# Patient Record
Sex: MALE | Race: WHITE | NOT HISPANIC OR LATINO | Employment: FULL TIME | URBAN - METROPOLITAN AREA
[De-identification: names, ages, dates, MRNs, and addresses within clinical notes are randomized per-mention and may not be internally consistent; named-entity substitution may affect disease eponyms.]

---

## 2018-01-13 ENCOUNTER — OFFICE VISIT (OUTPATIENT)
Dept: URGENT CARE | Facility: CLINIC | Age: 33
End: 2018-01-13
Payer: MEDICAID

## 2018-01-13 VITALS
DIASTOLIC BLOOD PRESSURE: 74 MMHG | OXYGEN SATURATION: 97 % | SYSTOLIC BLOOD PRESSURE: 124 MMHG | HEART RATE: 78 BPM | TEMPERATURE: 98 F | RESPIRATION RATE: 16 BRPM | HEIGHT: 73 IN | BODY MASS INDEX: 28.49 KG/M2 | WEIGHT: 215 LBS

## 2018-01-13 DIAGNOSIS — Z20.828 EXPOSURE TO INFLUENZA: ICD-10-CM

## 2018-01-13 DIAGNOSIS — R52 BODY ACHES: ICD-10-CM

## 2018-01-13 DIAGNOSIS — J06.9 UPPER RESPIRATORY TRACT INFECTION, UNSPECIFIED TYPE: Primary | ICD-10-CM

## 2018-01-13 LAB
CTP QC/QA: YES
FLUAV AG NPH QL: NEGATIVE
FLUBV AG NPH QL: NEGATIVE

## 2018-01-13 PROCEDURE — 99203 OFFICE O/P NEW LOW 30 MIN: CPT | Mod: S$GLB,,, | Performed by: FAMILY MEDICINE

## 2018-01-13 PROCEDURE — 87804 INFLUENZA ASSAY W/OPTIC: CPT | Mod: QW,S$GLB,, | Performed by: FAMILY MEDICINE

## 2018-01-13 RX ORDER — OSELTAMIVIR PHOSPHATE 75 MG/1
75 CAPSULE ORAL 2 TIMES DAILY
Qty: 10 CAPSULE | Refills: 0 | Status: SHIPPED | OUTPATIENT
Start: 2018-01-13 | End: 2018-01-18

## 2018-01-13 RX ORDER — PROMETHAZINE HYDROCHLORIDE AND CODEINE PHOSPHATE 6.25; 1 MG/5ML; MG/5ML
5 SOLUTION ORAL EVERY 4 HOURS PRN
Qty: 118 ML | Refills: 0 | OUTPATIENT
Start: 2018-01-13 | End: 2019-05-05

## 2018-01-13 NOTE — PROGRESS NOTES
"Subjective:       Patient ID: Ankit Lockwood is a 32 y.o. male.    Vitals:  height is 6' 1" (1.854 m) and weight is 97.5 kg (215 lb). His oral temperature is 98 °F (36.7 °C). His blood pressure is 124/74 and his pulse is 78. His respiration is 16 and oxygen saturation is 97%.     Chief Complaint: Cough    Cough   This is a new problem. The current episode started yesterday. The problem has been rapidly worsening. The problem occurs constantly. The cough is productive of sputum. Associated symptoms include chills and a fever. Pertinent negatives include no chest pain, ear pain, eye redness, headaches, myalgias, sore throat, shortness of breath or wheezing. The symptoms are aggravated by lying down. He has tried nothing for the symptoms. The treatment provided no relief.     Review of Systems   Constitution: Positive for chills, fever and malaise/fatigue.   HENT: Positive for congestion. Negative for ear pain, hoarse voice and sore throat.    Eyes: Negative for discharge and redness.   Cardiovascular: Negative for chest pain, dyspnea on exertion and leg swelling.   Respiratory: Positive for cough and sputum production. Negative for shortness of breath and wheezing.    Musculoskeletal: Negative for myalgias.   Gastrointestinal: Positive for nausea. Negative for abdominal pain.   Neurological: Negative for headaches.       Objective:      Physical Exam   Constitutional: He is oriented to person, place, and time. He appears well-developed and well-nourished. He is cooperative.  Non-toxic appearance. He does not appear ill. No distress.   HENT:   Head: Normocephalic and atraumatic.   Right Ear: Hearing, tympanic membrane, external ear and ear canal normal.   Left Ear: Hearing, tympanic membrane, external ear and ear canal normal.   Nose: No mucosal edema, rhinorrhea or nasal deformity. No epistaxis. Right sinus exhibits maxillary sinus tenderness. Right sinus exhibits no frontal sinus tenderness. Left sinus exhibits " maxillary sinus tenderness. Left sinus exhibits no frontal sinus tenderness.   Mouth/Throat: Uvula is midline and mucous membranes are normal. No trismus in the jaw. Normal dentition. No uvula swelling. Posterior oropharyngeal erythema present.   Eyes: Conjunctivae and lids are normal. No scleral icterus.   Sclera clear bilat   Neck: Trachea normal, full passive range of motion without pain and phonation normal. Neck supple.   Cardiovascular: Normal rate, regular rhythm, normal heart sounds, intact distal pulses and normal pulses.    Pulmonary/Chest: Effort normal and breath sounds normal. No respiratory distress. He has no decreased breath sounds. He has no wheezes. He has no rhonchi. He has no rales.   Abdominal: Soft. Normal appearance and bowel sounds are normal. He exhibits no distension. There is no tenderness.   Musculoskeletal: Normal range of motion. He exhibits no edema or deformity.   Lymphadenopathy:     He has no cervical adenopathy.        Right cervical: No superficial cervical adenopathy present.       Left cervical: No superficial cervical adenopathy present.   Neurological: He is alert and oriented to person, place, and time. He exhibits normal muscle tone. Coordination normal.   Skin: Skin is warm, dry and intact. He is not diaphoretic. No pallor.   Psychiatric: He has a normal mood and affect. His speech is normal and behavior is normal. Judgment and thought content normal. Cognition and memory are normal.   Nursing note and vitals reviewed.      Assessment:       1. Upper respiratory tract infection, unspecified type    2. Body aches    3. Exposure to influenza        Plan:         Upper respiratory tract infection, unspecified type  -     oseltamivir (TAMIFLU) 75 MG capsule; Take 1 capsule (75 mg total) by mouth 2 (two) times daily.  Dispense: 10 capsule; Refill: 0  -     promethazine-codeine 6.25-10 mg/5 ml (PHENERGAN WITH CODEINE) 6.25-10 mg/5 mL syrup; Take 5 mLs by mouth every 4 (four)  hours as needed.  Dispense: 118 mL; Refill: 0    Body aches  -     POCT Influenza A/B    Exposure to influenza      Follow Up Comments   Make sure that you follow up with your primary care doctor in the next 2-5 days if needed .  Return to the Urgent Care if signs or symptoms change and certainly if you have worsening symptoms go to the nearest emergency department for further evaluation.

## 2018-01-13 NOTE — LETTER
January 13, 2018      [facility]  [place] [address]  Phone: [phone]  Fax: [phone]       Patient: Ankit Lockwood   YOB: 1985  Date of Visit: 01/13/2018    To Whom It May Concern:    Deepa Lockwood  was at [facility] on 01/13/2018. He may return to work/school on 01/15/2018 with no restrictions. If you have any questions or concerns, or if I can be of further assistance, please do not hesitate to contact me.    Sincerely,    Shira Contreras MD

## 2018-01-13 NOTE — PATIENT INSTRUCTIONS

## 2019-05-05 ENCOUNTER — HOSPITAL ENCOUNTER (EMERGENCY)
Facility: HOSPITAL | Age: 34
Discharge: HOME | End: 2019-05-05
Attending: SURGERY

## 2019-05-05 VITALS
RESPIRATION RATE: 20 BRPM | TEMPERATURE: 96 F | WEIGHT: 198 LBS | DIASTOLIC BLOOD PRESSURE: 84 MMHG | BODY MASS INDEX: 26.12 KG/M2 | SYSTOLIC BLOOD PRESSURE: 120 MMHG | HEART RATE: 89 BPM | OXYGEN SATURATION: 100 %

## 2019-05-05 DIAGNOSIS — S00.03XA CONTUSION OF SCALP, INITIAL ENCOUNTER: Primary | ICD-10-CM

## 2019-05-05 PROCEDURE — 25000003 PHARM REV CODE 250: Performed by: SURGERY

## 2019-05-05 PROCEDURE — 99284 EMERGENCY DEPT VISIT MOD MDM: CPT

## 2019-05-05 RX ORDER — ACETAMINOPHEN 500 MG
1000 TABLET ORAL
Status: COMPLETED | OUTPATIENT
Start: 2019-05-05 | End: 2019-05-05

## 2019-05-05 RX ORDER — IBUPROFEN 800 MG/1
800 TABLET ORAL EVERY 6 HOURS PRN
Qty: 20 TABLET | Refills: 0 | OUTPATIENT
Start: 2019-05-05 | End: 2023-03-09

## 2019-05-05 RX ADMIN — ACETAMINOPHEN 1000 MG: 500 TABLET, FILM COATED ORAL at 02:05

## 2019-05-05 NOTE — ED PROVIDER NOTES
[facility]                                                 Chief Complaint  34 y.o. male with Head Injury    History of Present Illness  Ankit Lockwood Jr. presents to the emergency room with headache and neck pain  Patient admitted to drinking alcohol tonight with a slip and fall, hitting his head PTA  Patient states he has residual headache and neck pain since this fall, denies LOC  Patient is alert and appropriate, answers all questions, no signs of concussion now  Patient states that his neck and the back of his neck hurt, normal neuro exam today  Patient has a normal gait and normal strength in all 4 extremities, afebrile and stable    The history is provided by the patient   device was not used during this ER visit  Medical history: Bipolar disorder and migraine headaches  Surgeries: Testicular biopsy  No known allergies    Review of Systems and Physical Exam      Review of Systems  -- Constitution - no fever, denies fatigue, no weakness, no chills  -- Eyes - no tearing or redness, no visual disturbance  -- Ear, Nose - no tinnitus or earache, no nasal congestion or discharge  -- Mouth,Throat - no sore throat, no toothache, normal voice, normal swallowing  -- Respiratory - denies cough and congestion, no shortness of breath, no ORDAZ  -- Cardiovascular - denies chest pain, no palpitations, denies claudication  -- Gastrointestinal - denies abdominal pain, nausea, vomiting, or diarrhea  -- Genitourinary - no dysuria, no hematuria, no flank pain, no bladder pain  -- Musculoskeletal - cervical neck pain after slip and fall  -- Neurological - headache, denies weakness or seizure; no LOC  -- Skin - denies pallor, rash, or changes in skin. no hives or welts noted    Vital Signs  His oral temperature is 96.3 °F (35.7 °C).   His blood pressure is 120/84 and his pulse is 89.   His respiration is 20 and oxygen saturation is 100%.     Physical Exam  -- Nursing note and vitals  reviewed  -- Constitutional: Appears well-developed and well-nourished  -- Head: Atraumatic. Normocephalic. No obvious abnormality  -- Eyes: Pupils are equal and reactive to light. Normal conjunctiva and lids  -- Neck: Normal range of motion. Neck supple. No masses, trachea midline  -- Cardiac: Normal rate, regular rhythm and normal heart sounds  -- Pulmonary: Normal respiratory effort, breath sounds clear to auscultation  -- Abdominal: Soft, no tenderness. Normal bowel sounds. Normal liver edge  -- Musculoskeletal: Normal range of motion, no effusions. Joints stable   -- Neurological: No focal deficits. Showed good interaction with staff  -- Skin: Warm and dry. No evidence of rash or cellulitis    Emergency Room Course      Treatment and Evaluation  -- The CT of the head performed in the ER today was negative for acute pathology  -- The CT C-spine performed in the ER today was negative for acute fracture or dislocation  -- PO 1 g Tylenol given in today in the ER    Diagnosis  -- The encounter diagnosis was Contusion of scalp, initial encounter.    Disposition and Plan  -- Disposition: home  -- Condition: stable  -- Follow-up: Patient to follow up with Silver Cuellar MD in 1-2 days.  -- I advised the patient that we have found no life threatening condition today  -- At this time, I believe the patient is clinically stable for discharge.   -- The patient acknowledges that close follow up with a MD is required   -- Patient agrees to comply with all instruction and direction given in the ER    This note is dictated on M*Modal word recognition program.  There are word recognition mistakes that are occasionally missed on review.          Kendell Nash MD  05/05/19 0703

## 2019-05-05 NOTE — ED TRIAGE NOTES
34 y.o. male presents to ER ED 01/ED 01A   Chief Complaint   Patient presents with    Head Injury   Pt reports fall from standing, hit head on rock, hematoma noted to right temple. +LOC for 8 minutes. C/o headache and neck pain. No acute distress noted.

## 2020-03-16 ENCOUNTER — NURSE TRIAGE (OUTPATIENT)
Dept: ADMINISTRATIVE | Facility: CLINIC | Age: 35
End: 2020-03-16

## 2020-03-16 NOTE — TELEPHONE ENCOUNTER
Pt has history of asthma. Started with cough yesterday. Took one of his kids breathing treatments yesterday which did help some. Woke up this am and cough was worse. Instructed wife that he and his family should stay quarantined. Tylenol/Motrin for pain and OTC cough med. Helped wife download [facility] and coupon code [facility] provided. Call [facility] if cough worsens, fever, SOB.     Reason for Disposition   MILD asthma attack (e.g., no SOB at rest, mild SOB with walking, speaks normally in sentences, mild wheezing) and persists > 24 hours on appropriate treatment    Additional Information   Negative: Severe difficulty breathing (e.g., struggling for each breath, speaks in single words)   Negative: Bluish (or gray) lips or face   Negative: Wheezing started suddenly after medicine, an allergic food, or bee sting   Negative: Passed out (i.e., fainted, collapsed and was not responding)   Negative: Sounds like a life-threatening emergency to the triager   Negative: SEVERE asthma attack (e.g., very SOB at rest, speaks in single words, loud wheezes)   Negative: SEVERE wheezing or coughing and doesn't have nebulizer or inhaler available   Negative: Peak flow rate less than 50% of baseline level (RED Zone)   Negative: Hospitalized before with asthma; now feels same   Negative: Chest pain   Negative: Patient sounds very sick or weak to the triager   Negative: MODERATE asthma attack (e.g., SOB at rest, speaks in phrases, audible wheezes) and not resolved after 2 nebulizer or inhaler treatments given 20 minutes apart   Negative: Peak flow rate 50-80% of baseline level (YELLOW zone) after using 2 nebulizer or inhaler treatments given 20 minutes) apart   Negative: Fever > 103 F (39.4 C)   Negative: Fever > 101 F (38.3 C) and over 60 years of age   Negative: Continuous (nonstop) coughing that keeps patient from working or sleeping, and not improved after inhaler or nebulizer   Negative: Asthma  medicine (nebulizer or inhaler) is needed more frequently than every 4 hours to keep you comfortable   Negative: Fever present > 3 days (72 hours)   Negative: Patient wants to be seen    Protocols used: ASTHMA ATTACK-A-OH

## 2020-06-30 ENCOUNTER — HOSPITAL ENCOUNTER (OUTPATIENT)
Dept: PULMONOLOGY | Facility: HOSPITAL | Age: 35
Discharge: HOME | End: 2020-06-30
Payer: COMMERCIAL

## 2020-06-30 DIAGNOSIS — J45.909 ASTHMA: ICD-10-CM

## 2020-06-30 DIAGNOSIS — J45.909 ASTHMA, UNSPECIFIED ASTHMA SEVERITY, UNSPECIFIED WHETHER COMPLICATED, UNSPECIFIED WHETHER PERSISTENT: ICD-10-CM

## 2020-06-30 PROCEDURE — 94726 PLETHYSMOGRAPHY LUNG VOLUMES: CPT

## 2020-06-30 PROCEDURE — 94010 BREATHING CAPACITY TEST: CPT

## 2020-06-30 PROCEDURE — 94729 DIFFUSING CAPACITY: CPT

## 2020-08-03 LAB
BRPFT: ABNORMAL
DLCO ADJ PRE: 29.5 ML/(MIN*MMHG) (ref 29.94–43.79)
DLCO SINGLE BREATH LLN: 29.94
DLCO SINGLE BREATH PRE REF: 80 %
DLCO SINGLE BREATH REF: 36.86
DLCOC SBVA LLN: 3.63
DLCOC SBVA PRE REF: 97.6 %
DLCOC SBVA REF: 4.79
DLCOC SINGLE BREATH LLN: 29.94
DLCOC SINGLE BREATH PRE REF: 80 %
DLCOC SINGLE BREATH REF: 36.86
DLCOVA LLN: 3.63
DLCOVA PRE REF: 97.6 %
DLCOVA PRE: 4.67 ML/(MIN*MMHG*L) (ref 3.63–5.94)
DLCOVA REF: 4.79
DLVAADJ PRE: 4.67 ML/(MIN*MMHG*L) (ref 3.63–5.94)
ERV LLN: -16448.38
ERV PRE REF: 42.7 %
ERV REF: 1.62
FEF 25 75 LLN: 2.88
FEF 25 75 PRE REF: 75.6 %
FEF 25 75 REF: 4.72
FEV1 FVC LLN: 71
FEV1 FVC PRE REF: 97.4 %
FEV1 FVC REF: 82
FEV1 LLN: 3.83
FEV1 PRE REF: 79.8 %
FEV1 REF: 4.81
FRCPLETH LLN: 2.55
FRCPLETH PREREF: 67.3 %
FRCPLETH REF: 3.54
FVC LLN: 4.76
FVC PRE REF: 81.5 %
FVC REF: 5.93
IVC PRE: 4.83 L (ref 4.76–7.1)
IVC SINGLE BREATH LLN: 4.76
IVC SINGLE BREATH PRE REF: 81.5 %
IVC SINGLE BREATH REF: 5.93
PEF LLN: 8.4
PEF PRE REF: 89.1 %
PEF REF: 10.91
PRE DLCO: 29.5 ML/(MIN*MMHG) (ref 29.94–43.79)
PRE ERV: 0.69 L (ref -16448.38–16451.62)
PRE FEF 25 75: 3.57 L/S (ref 2.88–6.56)
PRE FET 100: 6.94 SEC
PRE FEV1 FVC: 79.5 % (ref 70.98–92.23)
PRE FEV1: 3.84 L (ref 3.83–5.8)
PRE FRC PL: 2.38 L
PRE FVC: 4.83 L (ref 4.76–7.1)
PRE PEF: 9.72 L/S (ref 8.4–13.42)
PRE RV: 1.48 L (ref 1.25–2.59)
PRE TLC: 4.91 L (ref 6.55–8.85)
RAW LLN: 3.06
RAW PRE REF: 83.8 %
RAW PRE: 2.56 CMH2O*S/L (ref 3.06–3.06)
RAW REF: 3.06
RV LLN: 1.25
RV PRE REF: 77.1 %
RV REF: 1.92
RVTLC LLN: 18
RVTLC PRE REF: 112.3 %
RVTLC PRE: 30.14 % (ref 17.85–35.81)
RVTLC REF: 27
TLC LLN: 6.55
TLC PRE REF: 63.8 %
TLC REF: 7.7
VA PRE: 5.31 L (ref 7.55–7.55)
VA SINGLE BREATH LLN: 7.55
VA SINGLE BREATH PRE REF: 70.3 %
VA SINGLE BREATH REF: 7.55
VC LLN: 4.76
VC PRE REF: 81.5 %
VC PRE: 4.83 L (ref 4.76–7.1)
VC REF: 5.93
VTGRAWPRE: 3.16 L

## 2021-05-04 ENCOUNTER — PATIENT MESSAGE (OUTPATIENT)
Dept: RESEARCH | Facility: HOSPITAL | Age: 36
End: 2021-05-04

## 2021-05-10 ENCOUNTER — PATIENT MESSAGE (OUTPATIENT)
Dept: RESEARCH | Facility: HOSPITAL | Age: 36
End: 2021-05-10

## 2022-05-13 ENCOUNTER — OCCUPATIONAL HEALTH (OUTPATIENT)
Dept: URGENT CARE | Facility: CLINIC | Age: 37
End: 2022-05-13

## 2022-05-13 DIAGNOSIS — Z02.1 PRE-EMPLOYMENT EXAMINATION: Primary | ICD-10-CM

## 2022-05-13 DIAGNOSIS — Z02.83 ENCOUNTER FOR DRUG SCREENING: ICD-10-CM

## 2022-05-13 LAB
CTP QC/QA: YES
POC 10 PANEL DRUG SCREEN: ABNORMAL

## 2022-05-13 PROCEDURE — 92552 AUDIOGRAM OCC MED: ICD-10-PCS | Mod: S$GLB,,, | Performed by: NURSE PRACTITIONER

## 2022-05-13 PROCEDURE — 99080 SPECIAL REPORTS OR FORMS: CPT | Mod: S$GLB,,, | Performed by: NURSE PRACTITIONER

## 2022-05-13 PROCEDURE — 80305 POCT RAPID DRUG SCREEN 10 PANEL: ICD-10-PCS | Mod: S$GLB,,, | Performed by: NURSE PRACTITIONER

## 2022-05-13 PROCEDURE — 94010 BREATHING CAPACITY TEST: CPT | Mod: S$GLB,,, | Performed by: NURSE PRACTITIONER

## 2022-05-13 PROCEDURE — 94010 PULMONARY FUNCTION SCREENING (OCC MED PHYSICALS): ICD-10-PCS | Mod: S$GLB,,, | Performed by: NURSE PRACTITIONER

## 2022-05-13 PROCEDURE — 97750 AGILITY TEST: ICD-10-PCS | Mod: S$GLB,,,

## 2022-05-13 PROCEDURE — 99499 PHYSICAL, BASIC COMPLEXITY: ICD-10-PCS | Mod: S$GLB,,, | Performed by: NURSE PRACTITIONER

## 2022-05-13 PROCEDURE — 99080 OSHA QUESTIONNAIRE: ICD-10-PCS | Mod: S$GLB,,, | Performed by: NURSE PRACTITIONER

## 2022-05-13 PROCEDURE — 99499 UNLISTED E&M SERVICE: CPT | Mod: S$GLB,,, | Performed by: NURSE PRACTITIONER

## 2022-05-13 PROCEDURE — 92552 PURE TONE AUDIOMETRY AIR: CPT | Mod: S$GLB,,, | Performed by: NURSE PRACTITIONER

## 2022-05-13 PROCEDURE — 80305 DRUG TEST PRSMV DIR OPT OBS: CPT | Mod: S$GLB,,, | Performed by: NURSE PRACTITIONER

## 2022-05-13 PROCEDURE — 97750 PHYSICAL PERFORMANCE TEST: CPT | Mod: S$GLB,,,

## 2022-05-13 RX ORDER — ONDANSETRON 4 MG/1
4 TABLET, FILM COATED ORAL EVERY 8 HOURS PRN
COMMUNITY
Start: 2022-05-11

## 2022-10-03 ENCOUNTER — OFFICE VISIT (OUTPATIENT)
Dept: URGENT CARE | Facility: CLINIC | Age: 37
End: 2022-10-03
Payer: COMMERCIAL

## 2022-10-03 VITALS
RESPIRATION RATE: 18 BRPM | TEMPERATURE: 100 F | WEIGHT: 158 LBS | DIASTOLIC BLOOD PRESSURE: 82 MMHG | OXYGEN SATURATION: 100 % | SYSTOLIC BLOOD PRESSURE: 134 MMHG | BODY MASS INDEX: 22.62 KG/M2 | HEART RATE: 74 BPM | HEIGHT: 70 IN

## 2022-10-03 DIAGNOSIS — Z02.6 ENCOUNTER RELATED TO WORKER'S COMPENSATION CLAIM: ICD-10-CM

## 2022-10-03 DIAGNOSIS — Z02.83 ENCOUNTER FOR DRUG SCREENING: ICD-10-CM

## 2022-10-03 DIAGNOSIS — T15.92XA FOREIGN BODY OF LEFT EYE, INITIAL ENCOUNTER: Primary | ICD-10-CM

## 2022-10-03 LAB
BREATH ALCOHOL: 0
CTP QC/QA: YES
POC 10 PANEL DRUG SCREEN: ABNORMAL

## 2022-10-03 PROCEDURE — 82075 POCT BREATH ALCOHOL TEST: ICD-10-PCS | Mod: S$GLB,,, | Performed by: NURSE PRACTITIONER

## 2022-10-03 PROCEDURE — 99204 PR OFFICE/OUTPT VISIT, NEW, LEVL IV, 45-59 MIN: ICD-10-PCS | Mod: S$GLB,,, | Performed by: NURSE PRACTITIONER

## 2022-10-03 PROCEDURE — 82075 ASSAY OF BREATH ETHANOL: CPT | Mod: S$GLB,,, | Performed by: NURSE PRACTITIONER

## 2022-10-03 PROCEDURE — 99204 OFFICE O/P NEW MOD 45 MIN: CPT | Mod: S$GLB,,, | Performed by: NURSE PRACTITIONER

## 2022-10-03 PROCEDURE — 80305 POCT RAPID DRUG SCREEN 10 PANEL: ICD-10-PCS | Mod: S$GLB,,, | Performed by: NURSE PRACTITIONER

## 2022-10-03 PROCEDURE — 80305 DRUG TEST PRSMV DIR OPT OBS: CPT | Mod: S$GLB,,, | Performed by: NURSE PRACTITIONER

## 2022-10-03 RX ORDER — OFLOXACIN 3 MG/ML
1 SOLUTION/ DROPS OPHTHALMIC 4 TIMES DAILY
Qty: 5 ML | Refills: 0 | Status: SHIPPED | OUTPATIENT
Start: 2022-10-03 | End: 2022-10-10

## 2022-10-03 NOTE — PROGRESS NOTES
Subjective:       Patient ID: Ankit Lockwood Jr. is a 37 y.o. male.    Chief Complaint: left eye    New workers comp injury - DOI 9/29/2022  [place] [employer] -  Patient was welding a pass when he set his gun down on the table when it fell and arched into his left eye. [facility]. Pain 10/10 mcj    Vision right 20/40 left 20/100 Abdelrahman 20/70    37-year-old male worker's comp injury to his left eye.  Patient states that last week he was welding and put his gun down and his leg hit the wire and caused a arc which caused a flash burn.  Patient states that once his of I adjusted he did not have any pain. He continued to work and worked on Friday.  Patient says that on the Saturday he started with mild pain and then on Sunday woke up with redness to the eye.  He went to [facility] he was diagnosed with corneal abrasion no foreign body noted at time of exam.  Patient comes today with worsening of symptoms including photophobia and change in vision.    Constitution: Negative.   HENT: Negative.     Cardiovascular: Negative.    Eyes:  Positive for eye trauma, eye itching, eye pain, eye redness, vision loss, double vision, blurred vision and eyelid swelling. Negative for foreign body in eye, eye discharge and photophobia.   Respiratory: Negative.     Gastrointestinal: Negative.  Negative for bowel incontinence.   Endocrine: negative.   Genitourinary: Negative.  Negative for dysuria, flank pain, bladder incontinence and pelvic pain.   Musculoskeletal: Negative.  Positive for pain. Negative for abnormal ROM of joint and back pain.   Skin: Negative.    Allergic/Immunologic: Negative.    Neurological: Negative.  Negative for numbness and tingling.   Hematologic/Lymphatic: Negative.    Psychiatric/Behavioral: Negative.        Objective:      Physical Exam  Vitals and nursing note reviewed.   Constitutional:       General: He is awake.      Appearance: Normal appearance. He is  well-developed.   HENT:      Head: Normocephalic and atraumatic. No raccoon eyes or Khan's sign.      Right Ear: Hearing and external ear normal.      Left Ear: Hearing and external ear normal.      Nose: Nose normal. No nasal deformity.   Eyes:      General: Lids are normal. Lids are everted, no foreign bodies appreciated. Vision grossly intact. Gaze aligned appropriately.      Extraocular Movements: Extraocular movements intact.      Conjunctiva/sclera:      Left eye: Left conjunctiva is injected.      Pupils: Pupils are equal, round, and reactive to light.     Neck:      Trachea: Trachea normal.   Cardiovascular:      Pulses: Normal pulses.   Pulmonary:      Effort: Pulmonary effort is normal. No respiratory distress.   Musculoskeletal:      Cervical back: No tenderness.   Skin:     General: Skin is warm and dry.   Neurological:      Mental Status: He is alert. He is not disoriented.      Gait: Gait is intact. Gait normal.   Psychiatric:         Attention and Perception: Attention normal.         Mood and Affect: Mood normal.         Speech: Speech normal.         Behavior: Behavior normal. Behavior is cooperative.       Assessment:       1. Foreign body of left eye, initial encounter    2. Encounter related to worker's compensation claim    3. Encounter for drug screening          Plan:     Patient has not filed claim until this morning with his company. [facility]an with [facility] is reaching out to company and insurance carrier to get authorization for appt with opthamamology    Medications Ordered This Encounter   Medications    ofloxacin (OCUFLOX) 0.3 % ophthalmic solution     Sig: Place 1 drop into the left eye 4 (four) times daily. for 7 days     Dispense:  5 mL     Refill:  0     Patient Instructions: Attention not to aggravate affected area, Referral to specialist to be scheduled, once authorized (natural tears; antibiotic eye drops; opthalmology consult when authorized)   Restrictions: Disabled until next office  visit  Follow up in about 1 week (around 10/10/2022).

## 2022-10-03 NOTE — LETTER
October 3, 2022      [facility]  [address] [place] [address]  Phone: [phone]  Fax: [phone]       Patient: Ankit Lockwood   YOB: 1985  Date of Visit: 10/03/2022    To Whom It May Concern:    Deepa Lockwood  was at [facility] on 10/03/2022 accompanied by his wife. Please excuse her for any missed work due to having to drive him to his appointments.  If you have any questions or concerns, or if I can be of further assistance, please do not hesitate to contact me.    Sincerely,      GYPSY Holt

## 2022-10-03 NOTE — LETTER
[facility]  [address] [address]  Phone: [phone]  Fax: [phone]  [facility]: [id number]    Pt Name: Ankit Lockwood Jr.  Injury Date: 09/29/2022   Employee ID: [id number] Date of First Treatment: 10/03/2022   Company: [employer]      Appointment Time: 10:38 AM Arrived: 10:38 AM    Provider: GYPSY Sarmiento Time Out: 1:48 PM     Office Treatment:   1. Foreign body of left eye, initial encounter    2. Encounter related to worker's compensation claim    3. Encounter for drug screening      Medications Ordered This Encounter   Medications    ofloxacin (OCUFLOX) 0.3 % ophthalmic solution      Patient Instructions: Attention not to aggravate affected area, Referral to specialist to be scheduled, once authorized (natural tears; antibiotic eye drops; opthalmology consult when authorized)      Restrictions: Disabled until next office visit     Return Appointment: 10/10/2022 at 10:30 AM KRYSTINA

## 2022-10-03 NOTE — PROGRESS NOTES
Subjective:       Patient ID: Ankit Lockwood Jr. is a 37 y.o. male.    Chief Complaint: left eye    HPI  ROS     Objective:      Physical Exam    Assessment:       1. Encounter related to worker's compensation claim    2. Encounter for drug screening          Plan:                   No follow-ups on file.

## 2022-10-07 ENCOUNTER — TELEPHONE (OUTPATIENT)
Dept: URGENT CARE | Facility: CLINIC | Age: 37
End: 2022-10-07
Payer: COMMERCIAL

## 2022-10-07 NOTE — TELEPHONE ENCOUNTER
Called to check on patient. He states he applied ointment that night and it caused fb to come out. He states he saw opthamologist who said no fb but could see rust ring. He states doing much better but still with some photosensitivity.

## 2022-10-10 ENCOUNTER — OFFICE VISIT (OUTPATIENT)
Dept: URGENT CARE | Facility: CLINIC | Age: 37
End: 2022-10-10
Payer: COMMERCIAL

## 2022-10-10 VITALS
DIASTOLIC BLOOD PRESSURE: 80 MMHG | HEART RATE: 74 BPM | TEMPERATURE: 98 F | SYSTOLIC BLOOD PRESSURE: 126 MMHG | WEIGHT: 158 LBS | RESPIRATION RATE: 18 BRPM | HEIGHT: 70 IN | BODY MASS INDEX: 22.62 KG/M2

## 2022-10-10 DIAGNOSIS — Z02.6 ENCOUNTER RELATED TO WORKER'S COMPENSATION CLAIM: Primary | ICD-10-CM

## 2022-10-10 DIAGNOSIS — T15.92XA FOREIGN BODY OF LEFT EYE, INITIAL ENCOUNTER: ICD-10-CM

## 2022-10-10 PROCEDURE — 99213 OFFICE O/P EST LOW 20 MIN: CPT | Mod: S$GLB,,, | Performed by: NURSE PRACTITIONER

## 2022-10-10 PROCEDURE — 99213 PR OFFICE/OUTPT VISIT, EST, LEVL III, 20-29 MIN: ICD-10-PCS | Mod: S$GLB,,, | Performed by: NURSE PRACTITIONER

## 2022-10-10 NOTE — PROGRESS NOTES
Subjective:       Patient ID: Ankit Lockwood Jr. is a 37 y.o. male.    Chief Complaint: Eye Problem    WC follow-up of Eye Injury ( DOI 09-29-22 ) Works for [employer]. Pain score 2/10 with complaints of extreme Light sensitivity, Burning sensation. Using Erythromycin Oph Oint, Ocuflox Oph Sol. SH    Eye Problem   Associated symptoms include blurred vision, an eye discharge and photophobia. Pertinent negatives include no double vision or itching.   Constitution: Negative.   HENT: Negative.     Cardiovascular: Negative.    Eyes:  Positive for foreign body in eye, eye discharge, eye pain, photophobia and blurred vision. Negative for eye itching, vision loss, double vision and eyelid swelling.   Respiratory: Negative.     Gastrointestinal: Negative.  Negative for bowel incontinence.   Endocrine: negative.   Genitourinary: Negative.  Negative for dysuria, flank pain, bladder incontinence and pelvic pain.   Musculoskeletal: Negative.  Negative for pain, abnormal ROM of joint and back pain.   Skin: Negative.    Allergic/Immunologic: Negative.    Neurological: Negative.  Negative for dizziness, light-headedness and headaches.   Hematologic/Lymphatic: Negative.    Psychiatric/Behavioral: Negative.        Objective:      Physical Exam  Vitals and nursing note reviewed.   Constitutional:       General: He is awake.      Appearance: Normal appearance. He is well-developed.   HENT:      Head: Normocephalic and atraumatic. No raccoon eyes or Khan's sign.      Right Ear: Hearing and external ear normal.      Left Ear: Hearing and external ear normal.      Nose: Nose normal. No nasal deformity.   Eyes:      General: Lids are normal. Lids are everted, no foreign bodies appreciated. Vision grossly intact. Gaze aligned appropriately.      Extraocular Movements: Extraocular movements intact.      Conjunctiva/sclera:      Left eye: Left conjunctiva is injected.      Pupils: Pupils are equal, round, and reactive to light.     Neck:       Trachea: Trachea normal.   Cardiovascular:      Pulses: Normal pulses.   Pulmonary:      Effort: Pulmonary effort is normal. No respiratory distress.   Musculoskeletal:      Cervical back: No tenderness.   Skin:     General: Skin is warm and dry.   Neurological:      Mental Status: He is alert. He is not disoriented.      Gait: Gait is intact. Gait normal.   Psychiatric:         Attention and Perception: Attention normal.         Mood and Affect: Mood normal.         Speech: Speech normal.         Behavior: Behavior normal. Behavior is cooperative.       Assessment:       1. Encounter related to worker's compensation claim    2. Foreign body of left eye, initial encounter          Plan:     Patient following daily with opth. Who will update duty status on daily basis       Patient Instructions: Attention not to aggravate affected area (keep all f/u with opthalomology)   Restrictions: Disabled until next office visit (daily check with opthalomology)  Follow up if symptoms worsen or fail to improve.

## 2022-10-10 NOTE — LETTER
[facility]  [address] [address]  Phone: [phone]  Fax: [phone]  [facility]: [id number]    Pt Name: Ankit Lockwood Jr.  Injury Date: 09/29/2022   Employee ID: [id number] Date of Treatment: 10/10/2022   Company: [employer]      Appointment Time: 10:30 AM Arrived: 10:30 AM    Provider: GYPSY Sarmiento Time Out:12:00 PM     Office Treatment:   1. Encounter related to worker's compensation claim    2. Foreign body of left eye, initial encounter          Patient Instructions: Attention not to aggravate affected area (keep all f/u with opthalomology)      Restrictions: Disabled until next office visit (daily check with opthalomology)     Return as needed. KRYSTINA

## 2022-11-05 ENCOUNTER — NURSE TRIAGE (OUTPATIENT)
Dept: ADMINISTRATIVE | Facility: CLINIC | Age: 37
End: 2022-11-05
Payer: COMMERCIAL

## 2022-11-05 NOTE — TELEPHONE ENCOUNTER
Wife calling for , right lower leg, veins showing from top of foot to behind calf. Pt denies pain and redness. Wife states does not appear to be swollen. Per care advice, advised to see pcp within 2 weeks.    Reason for Disposition   [1] MILD swelling of both ankles (i.e., pedal edema) AND [2] varicose veins    Additional Information   Negative: SEVERE difficulty breathing (e.g., struggling for each breath, speaks in single words)   Negative: Looks like a broken bone or dislocated joint (e.g., crooked or deformed)   Negative: Sounds like a life-threatening emergency to the triager   Negative: [1] Difficulty breathing with exertion (e.g., walking) AND [2] new-onset or worsening   Negative: [1] Red area or streak AND [2] fever   Negative: [1] Swelling is painful to touch AND [2] fever   Negative: [1] Cast on leg or ankle AND [2] now increased pain   Negative: Patient sounds very sick or weak to the triager   Negative: SEVERE leg swelling (e.g., swelling extends above knee, entire leg is swollen, weeping fluid)   Negative: [1] Red area or streak [2] large (> 2 in. or 5 cm)   Negative: [1] Thigh or calf pain AND [2] only 1 side AND [3] present > 1 hour   Negative: [1] Thigh, calf, or ankle swelling AND [2] only 1 side   Negative: [1] Thigh, calf, or ankle swelling AND [2] bilateral AND [3] 1 side is more swollen   Negative: [1] MODERATE leg swelling (e.g., swelling extends up to knees) AND [2] new-onset or worsening   Negative: Swelling of face, arm or hands  (Exception: slight puffiness of fingers occurring during hot weather)   Negative: Looks like a boil, infected sore, deep ulcer or other infected rash (spreading redness, pus)   Negative: [1] MILD swelling of both ankles (i.e., pedal edema) AND [2] new-onset or worsening    Protocols used: Leg Swelling and Edema-A-AH

## 2023-08-16 ENCOUNTER — OFFICE VISIT (OUTPATIENT)
Dept: UROLOGY | Facility: CLINIC | Age: 38
End: 2023-08-16

## 2023-08-16 VITALS
BODY MASS INDEX: 21.21 KG/M2 | HEIGHT: 73 IN | WEIGHT: 160.06 LBS | DIASTOLIC BLOOD PRESSURE: 77 MMHG | SYSTOLIC BLOOD PRESSURE: 113 MMHG | HEART RATE: 91 BPM

## 2023-08-16 DIAGNOSIS — N50.812 PAIN IN LEFT TESTICLE: Primary | ICD-10-CM

## 2023-08-16 DIAGNOSIS — N52.8 OTHER MALE ERECTILE DYSFUNCTION: ICD-10-CM

## 2023-08-16 PROCEDURE — 99204 OFFICE O/P NEW MOD 45 MIN: CPT | Mod: S$PBB,,, | Performed by: UROLOGY

## 2023-08-16 PROCEDURE — 99204 PR OFFICE/OUTPT VISIT, NEW, LEVL IV, 45-59 MIN: ICD-10-PCS | Mod: S$PBB,,, | Performed by: UROLOGY

## 2023-08-16 PROCEDURE — 99999 PR PBB SHADOW E&M-EST. PATIENT-LVL III: ICD-10-PCS | Mod: PBBFAC,,, | Performed by: UROLOGY

## 2023-08-16 PROCEDURE — 99999 PR PBB SHADOW E&M-EST. PATIENT-LVL III: CPT | Mod: PBBFAC,,, | Performed by: UROLOGY

## 2023-08-16 PROCEDURE — 99213 OFFICE O/P EST LOW 20 MIN: CPT | Mod: PBBFAC,PO | Performed by: UROLOGY

## 2023-08-16 RX ORDER — TADALAFIL 20 MG/1
20 TABLET ORAL DAILY PRN
Qty: 30 TABLET | Refills: 11 | Status: SHIPPED | OUTPATIENT
Start: 2023-08-16 | End: 2024-08-15

## 2023-08-16 RX ORDER — CLOTRIMAZOLE AND BETAMETHASONE DIPROPIONATE 10; .64 MG/G; MG/G
CREAM TOPICAL
COMMUNITY
Start: 2023-08-09

## 2023-08-16 RX ORDER — IBUPROFEN 800 MG/1
800 TABLET ORAL 3 TIMES DAILY PRN
Qty: 90 TABLET | Refills: 0 | Status: SHIPPED | OUTPATIENT
Start: 2023-08-16 | End: 2023-09-15

## 2023-08-16 NOTE — PROGRESS NOTES
[facility] - Urology   Clinic Note    SUBJECTIVE:     Chief Complaint   Patient presents with    Cyst       Referral from: Self, Aaareferral.    History of Present Illness:  Ankit Lockwood Jr. is a 38 y.o. male who presents to clinic for left orchalgia.    Patient presents with complaints of left testicular pain for several weeks.  Describes pain as dull and achy but occasionally sharp.  The pain waxes and wanes in intensity and is intermittent.  Denies any issues with erections.  Denies any high-risk sexual activity.  Denies any dysuria or significantly bothersome urinary complaints.  Also endorses erectile dysfunction.  He is able to get an erection but is not able to maintain it.    Patient endorses no additional complaints at this time.    Past Medical History:   Diagnosis Date    Bipolar 1 disorder     Migraine headache     Sciatica        Past Surgical History:   Procedure Laterality Date    TESTICLE BIOPSY         History reviewed. No pertinent family history.    Social History     Tobacco Use    Smoking status: Every Day     Current packs/day: 0.50     Types: Cigarettes    Smokeless tobacco: Never   Substance Use Topics    Alcohol use: Yes     Alcohol/week: 2.0 standard drinks of alcohol     Types: 2 Cans of beer per week     Comment: daily    Drug use: No       Current Outpatient Medications on File Prior to Visit   Medication Sig Dispense Refill    clotrimazole-betamethasone 1-0.05% (LOTRISONE) cream SMARTSIG:Topical Morning-Evening      ibuprofen (ADVIL,MOTRIN) 800 MG tablet Take 1 tablet (800 mg total) by mouth every 6 (six) hours as needed for Pain. 30 tablet 0    erythromycin (ROMYCIN) ophthalmic ointment Place into the left eye 3 (three) times daily. (Patient not taking: Reported on 8/16/2023) 3.5 g 0    famotidine (PEPCID) 20 MG tablet Take 1 tablet (20 mg total) by mouth nightly as needed. 7 tablet 0    fluticasone propionate (FLONASE) 50 mcg/actuation nasal spray 1 spray (50 mcg total) by Each  "Nostril route once daily. (Patient not taking: Reported on 8/16/2023) 1 Bottle 0    methocarbamol (ROBAXIN) 500 MG Tab Take 500 mg by mouth 4 (four) times daily.      mupirocin (BACTROBAN) 2 % ointment Apply topically 2 (two) times daily. (Patient not taking: Reported on 8/16/2023) 1 Tube 0    omeprazole (PRILOSEC) 20 MG capsule Take 1 capsule (20 mg total) by mouth once daily. for 7 days 7 capsule 0    ondansetron (ZOFRAN) 4 MG tablet Take 4 mg by mouth every 8 (eight) hours as needed.      ondansetron (ZOFRAN-ODT) 4 MG TbDL Take 1 tablet (4 mg total) by mouth every 8 (eight) hours as needed. (Patient not taking: Reported on 8/16/2023) 12 tablet 0    ondansetron (ZOFRAN-ODT) 4 MG TbDL Take 1 tablet (4 mg total) by mouth every 8 (eight) hours as needed (nausea). (Patient not taking: Reported on 8/16/2023) 10 tablet 0    risperidone (RISPERDAL ORAL) Take by mouth.      sodium chloride (SALINE MIST) 0.65 % nasal spray 1 spray by Nasal route as needed for Congestion. (Patient not taking: Reported on 8/16/2023)  12    sumatriptan (IMITREX) 25 MG Tab Take 1 tablet (25 mg total) by mouth every 2 (two) hours as needed (Do not exceed 4 doses in one day). 10 tablet 0     No current facility-administered medications on file prior to visit.       Review of patient's allergies indicates:  No Known Allergies    Review of Systems:  A review of 10+ systems was conducted with pertinent positive and negative findings documented in HPI with all other systems reviewed and negative.    OBJECTIVE:     Estimated body mass index is 21.12 kg/m² as calculated from the following:    Height as of this encounter: 6' 1" (1.854 m).    Weight as of this encounter: 72.6 kg (160 lb 0.9 oz).    Vital Signs (Most Recent)  Vitals:    08/16/23 1341   BP: 113/77   Pulse: 91       Physical Exam:  GENERAL: patient sitting comfortably  HEENT: normocephalic  NECK: supple, no JVD  PULM: normal chest rise, no increased WOB  HEART: non-diaphoretic  ABDO: soft, " "nondistended, nontender  BACK: no CVA tenderness bilaterally  SKIN: warm, dry, well perfused  EXT: no bruising or edema  NEURO: grossly normal with no focal deficits  PSYCH: appropriate mood and affect    Genitourinary Exam:  Unremarkable phallus with orthotopic meatus without lesions.  Bilaterally descended testes in normal position and lie without appreciable masses.    Left testicle with mild ttp.  Scrotum without erythema, lesions, or masses.  No appreciable inguinal hernias.    Lab Results   Component Value Date    BUN 11 05/12/2023    CREATININE 0.87 05/12/2023    WBC 7.66 05/12/2023    HGB 14.4 05/12/2023    HCT 42.7 05/12/2023     05/12/2023    AST 30 05/12/2023    ALT 21 05/12/2023    ALKPHOS 54 05/12/2023    ALBUMIN 4.3 05/12/2023    HGBA1C 5.1 06/11/2020        Imaging:  I have personally reviewed all relevant imaging studies.    No results found for this or any previous visit (from the past 2160 hour(s)).  No results found for this or any previous visit (from the past 2160 hour(s)).  US Scrotum And Testicles  Narrative: EXAMINATION:  US SCROTUM AND TESTICLES    CLINICAL HISTORY:  Left testicular pain    TECHNIQUE:  Sonography of the scrotum and testes.    FINDINGS:  The right and left testicles measure 4.8 x 2.8 x 3.6 cm and 4 x 2.7 x 2.9 cm respectively.  The right testicle has a homogeneous echotexture and demonstrate appropriate arterial and venous blood flow.  The left testicle contains a single 2-3 mm simple cyst within the superior aspect and demonstrate appropriate arterial and venous blood flow.  The right epididymal head measures 4 cm and is unremarkable.  The left epididymal head measures 0.5 cm and is unremarkable.  Impression: No acute findings.    Electronically signed by: Cameron David MD  Date:    08/13/2023  Time:    12:45       PSA:  No results found for: "PSA", "PSADIAG", "PSATOTAL", "PSAFREE"    Testosterone:  No results found for: "TOTALTESTOST", "TESTOSTERONE"     ASSESSMENT "     1. Pain in left testicle    2. Other male erectile dysfunction        PLAN:     left orchalgia     - We had a long discussion regarding etiology including torsion, trauma, infection, hernia repair, epididymal cyst, and that most is idiopathic.     - We discussed several options conservative management with observation, scrotal support with a jock strap, decreased physical activity, and heat/cold therapy. We discussed the pain could be from either inflammatory, infectious or neuropathic pain. We would start initially with anti-inflammatories with ibuprofen 600-800 mg TID or naproxen 500 mg BID (% efficacy). Pelvic floor PT can be helpful. Spermatic cord blocks were also discussed.    - If nothing helps, final option would be to consider simple orchiectomy (~75% efficacy).    - Ibuprofen 800mg BID-TID for 2 weeks prescribed. Follow up PRN.    ED    - Cialis 20mg prescribed    Uriel Sprague MD  Urology  [facility]    Disclaimer: This note has been generated using voice-recognition software. There may be typographical errors that have been missed during proof-reading.

## 2023-11-16 ENCOUNTER — PATIENT MESSAGE (OUTPATIENT)
Dept: RESEARCH | Facility: HOSPITAL | Age: 38
End: 2023-11-16
Payer: COMMERCIAL

## 2025-07-25 ENCOUNTER — PATIENT OUTREACH (OUTPATIENT)
Facility: OTHER | Age: 40
End: 2025-07-25
Payer: COMMERCIAL

## 2025-07-25 NOTE — PROGRESS NOTES
Jenn Montemayor LPN  ED Navigator  Emergency Department    Project: [facility] ED Navigator  Role: Community Health Worker    Date: 07/25/2025  Patient Name: Ankit Lockwood Jr.  MRN: [id number]  PCP: No, Primary Doctor    Assessment:     Ankit Lockwood Jr. is a 40 y.o. male who has presented to ED for congestion. Patient has visited the ED 1 times in the past 3 months. Patient did not contact PCP.     ED Navigator Initial Assessment    ED Navigator Enrollment Documentation  Consent to Services  Does patient consent to completing the assessment?: Yes  Contact  Method of Initial Contact: Phone  Transportation  Insurance Coverage  Do you have coverage/adequate coverage?: Yes  Specialist Appointment  Did the patient come to the ED to see a specialist?: No  Does the patient have a pending specialist referral?: No  Does the patient have a specialist appointment made?: No  PCP Follow Up Appointment  Medications  Is patient able to afford medication?: Yes  Psychological  Food  Communication/Education  Other Financial Concerns  Other Social Barriers/Concerns  Primary Barrier  Plan:  (Comment: will call in 2 weeks to check on pt.)         Social History     Socioeconomic History    Marital status:    Tobacco Use    Smoking status: Every Day     Current packs/day: 0.50     Types: Cigarettes     Passive exposure: Never    Smokeless tobacco: Never   Substance and Sexual Activity    Alcohol use: Not Currently     Alcohol/week: 2.0 standard drinks of alcohol     Types: 2 Cans of beer per week     Comment: daily    Drug use: Yes     Types: Marijuana, Other-see comments     Comment: medical marijuana daily    Sexual activity: Yes     Partners: Female     Social Drivers of Health     Financial Resource Strain: Low Risk  (7/25/2025)    Overall Financial Resource Strain (CARDIA)     Difficulty of Paying Living Expenses: Not very hard   Food Insecurity: No Food Insecurity (7/25/2025)    Hunger Vital Sign     Worried About Running Out  of Food in the Last Year: Never true     Ran Out of Food in the Last Year: Never true   Transportation Needs: No Transportation Needs (7/25/2025)    PRAPARE - Transportation     Lack of Transportation (Medical): No     Lack of Transportation (Non-Medical): No   Physical Activity: Sufficiently Active (3/7/2025)    Exercise Vital Sign     Days of Exercise per Week: 7 days     Minutes of Exercise per Session: 60 min   Stress: No Stress Concern Present (3/7/2025)    [employer] - Occupational Stress Questionnaire     Feeling of Stress : Only a little   Housing Stability: Low Risk  (7/25/2025)    Housing Stability Vital Sign     Unable to Pay for Housing in the Last Year: No     Homeless in the Last Year: No       Plan:     Patient went to emergency department for congestion and states he is feeling better today . Pt declined making a pcp appt but I offered to call him to check up on him in two weeks with agreement follow up for 8/11/25.    Appointment made with: No, Primary Doctor

## 2025-08-11 ENCOUNTER — PATIENT OUTREACH (OUTPATIENT)
Facility: OTHER | Age: 40
End: 2025-08-11
Payer: COMMERCIAL